# Patient Record
Sex: FEMALE | Race: WHITE | NOT HISPANIC OR LATINO | Employment: FULL TIME | URBAN - METROPOLITAN AREA
[De-identification: names, ages, dates, MRNs, and addresses within clinical notes are randomized per-mention and may not be internally consistent; named-entity substitution may affect disease eponyms.]

---

## 2017-01-03 ENCOUNTER — ANESTHESIA (OUTPATIENT)
Dept: PERIOP | Facility: HOSPITAL | Age: 52
End: 2017-01-03
Payer: COMMERCIAL

## 2017-01-03 ENCOUNTER — HOSPITAL ENCOUNTER (OUTPATIENT)
Facility: HOSPITAL | Age: 52
Setting detail: OUTPATIENT SURGERY
Discharge: HOME | End: 2017-01-03
Attending: OBSTETRICS & GYNECOLOGY | Admitting: OBSTETRICS & GYNECOLOGY
Payer: COMMERCIAL

## 2017-01-03 VITALS
SYSTOLIC BLOOD PRESSURE: 130 MMHG | TEMPERATURE: 97.5 F | DIASTOLIC BLOOD PRESSURE: 75 MMHG | HEIGHT: 67 IN | BODY MASS INDEX: 19.15 KG/M2 | WEIGHT: 122 LBS | HEART RATE: 73 BPM | OXYGEN SATURATION: 100 % | RESPIRATION RATE: 16 BRPM

## 2017-01-03 PROCEDURE — 51798 US URINE CAPACITY MEASURE: CPT | Performed by: OBSTETRICS & GYNECOLOGY

## 2017-01-03 PROCEDURE — C1771 REP DEV, URINARY, W/SLING: HCPCS | Performed by: OBSTETRICS & GYNECOLOGY

## 2017-01-03 DEVICE — SINGLE INCISION SLING SYSTEM
Type: IMPLANTABLE DEVICE | Status: FUNCTIONAL
Brand: ALTIS

## 2017-01-03 RX ORDER — MIDAZOLAM HYDROCHLORIDE 1 MG/ML
INJECTION INTRAMUSCULAR; INTRAVENOUS AS NEEDED
Status: DISCONTINUED | OUTPATIENT
Start: 2017-01-03 | End: 2017-01-03 | Stop reason: SURG

## 2017-01-03 RX ORDER — IBUPROFEN 600 MG/1
600 TABLET ORAL EVERY 6 HOURS PRN
Status: DISCONTINUED | OUTPATIENT
Start: 2017-01-03 | End: 2017-01-03 | Stop reason: HOSPADM

## 2017-01-03 RX ORDER — OXYCODONE HYDROCHLORIDE 5 MG/1
5 TABLET ORAL EVERY 4 HOURS PRN
Status: DISCONTINUED | OUTPATIENT
Start: 2017-01-03 | End: 2017-01-03 | Stop reason: HOSPADM

## 2017-01-03 RX ORDER — PROPOFOL 10 MG/ML
INJECTION, EMULSION INTRAVENOUS CONTINUOUS PRN
Status: DISCONTINUED | OUTPATIENT
Start: 2017-01-03 | End: 2017-01-03 | Stop reason: SURG

## 2017-01-03 RX ORDER — ONDANSETRON 2 MG/ML
INJECTION INTRAMUSCULAR; INTRAVENOUS AS NEEDED
Status: DISCONTINUED | OUTPATIENT
Start: 2017-01-03 | End: 2017-01-03 | Stop reason: SURG

## 2017-01-03 RX ORDER — FENTANYL CITRATE 50 UG/ML
INJECTION, SOLUTION INTRAMUSCULAR; INTRAVENOUS AS NEEDED
Status: DISCONTINUED | OUTPATIENT
Start: 2017-01-03 | End: 2017-01-03 | Stop reason: SURG

## 2017-01-03 RX ORDER — SODIUM CHLORIDE, SODIUM LACTATE, POTASSIUM CHLORIDE, CALCIUM CHLORIDE 600; 310; 30; 20 MG/100ML; MG/100ML; MG/100ML; MG/100ML
125 INJECTION, SOLUTION INTRAVENOUS CONTINUOUS
Status: DISCONTINUED | OUTPATIENT
Start: 2017-01-03 | End: 2017-01-03 | Stop reason: HOSPADM

## 2017-01-03 RX ORDER — ONDANSETRON 2 MG/ML
4 INJECTION INTRAMUSCULAR; INTRAVENOUS EVERY 6 HOURS PRN
Status: DISCONTINUED | OUTPATIENT
Start: 2017-01-03 | End: 2017-01-03 | Stop reason: HOSPADM

## 2017-01-03 RX ORDER — FENTANYL CITRATE/PF 50 MCG/ML
50 SYRINGE (ML) INJECTION
Status: DISCONTINUED | OUTPATIENT
Start: 2017-01-03 | End: 2017-01-03 | Stop reason: HOSPADM

## 2017-01-03 RX ORDER — ONDANSETRON 2 MG/ML
4 INJECTION INTRAMUSCULAR; INTRAVENOUS ONCE
Status: DISCONTINUED | OUTPATIENT
Start: 2017-01-03 | End: 2017-01-03 | Stop reason: HOSPADM

## 2017-01-03 RX ORDER — MORPHINE SULFATE 2 MG/ML
2 INJECTION, SOLUTION INTRAMUSCULAR; INTRAVENOUS
Status: DISCONTINUED | OUTPATIENT
Start: 2017-01-03 | End: 2017-01-03 | Stop reason: HOSPADM

## 2017-01-03 RX ORDER — SODIUM CHLORIDE 9 MG/ML
125 INJECTION, SOLUTION INTRAVENOUS CONTINUOUS
Status: DISCONTINUED | OUTPATIENT
Start: 2017-01-03 | End: 2017-01-03 | Stop reason: HOSPADM

## 2017-01-03 RX ORDER — PROPOFOL 10 MG/ML
INJECTION, EMULSION INTRAVENOUS AS NEEDED
Status: DISCONTINUED | OUTPATIENT
Start: 2017-01-03 | End: 2017-01-03 | Stop reason: SURG

## 2017-01-03 RX ORDER — HYDROMORPHONE HCL 110MG/55ML
1 PATIENT CONTROLLED ANALGESIA SYRINGE INTRAVENOUS EVERY 4 HOURS PRN
Status: DISCONTINUED | OUTPATIENT
Start: 2017-01-03 | End: 2017-01-03 | Stop reason: HOSPADM

## 2017-01-03 RX ADMIN — ONDANSETRON HYDROCHLORIDE 4 MG: 2 INJECTION, SOLUTION INTRAVENOUS at 14:54

## 2017-01-03 RX ADMIN — PROPOFOL 20 MG: 10 INJECTION, EMULSION INTRAVENOUS at 14:23

## 2017-01-03 RX ADMIN — FENTANYL CITRATE 100 MCG: 50 INJECTION, SOLUTION INTRAMUSCULAR; INTRAVENOUS at 14:15

## 2017-01-03 RX ADMIN — MIDAZOLAM HYDROCHLORIDE 2 MG: 1 INJECTION, SOLUTION INTRAMUSCULAR; INTRAVENOUS at 14:12

## 2017-01-03 RX ADMIN — PROPOFOL 20 MG: 10 INJECTION, EMULSION INTRAVENOUS at 14:50

## 2017-01-03 RX ADMIN — SODIUM CHLORIDE: 0.9 INJECTION, SOLUTION INTRAVENOUS at 14:52

## 2017-01-03 RX ADMIN — SODIUM CHLORIDE 125 ML/HR: 0.9 INJECTION, SOLUTION INTRAVENOUS at 12:08

## 2017-01-03 RX ADMIN — PROPOFOL 70 MCG/KG/MIN: 10 INJECTION, EMULSION INTRAVENOUS at 14:12

## 2017-01-03 RX ADMIN — CEFAZOLIN SODIUM 1000 MG: 1 SOLUTION INTRAVENOUS at 14:10

## 2017-01-03 RX ADMIN — MIDAZOLAM HYDROCHLORIDE 2 MG: 1 INJECTION, SOLUTION INTRAMUSCULAR; INTRAVENOUS at 14:10

## 2021-04-08 DIAGNOSIS — Z23 ENCOUNTER FOR IMMUNIZATION: ICD-10-CM

## 2021-07-07 ENCOUNTER — EVALUATION (OUTPATIENT)
Dept: PHYSICAL THERAPY | Facility: CLINIC | Age: 56
End: 2021-07-07
Payer: COMMERCIAL

## 2021-07-07 DIAGNOSIS — M76.32 ILIOTIBIAL BAND SYNDROME OF LEFT SIDE: Primary | ICD-10-CM

## 2021-07-07 DIAGNOSIS — M25.562 LEFT KNEE PAIN, UNSPECIFIED CHRONICITY: ICD-10-CM

## 2021-07-07 PROCEDURE — 97110 THERAPEUTIC EXERCISES: CPT | Performed by: PHYSICAL THERAPIST

## 2021-07-07 PROCEDURE — 97162 PT EVAL MOD COMPLEX 30 MIN: CPT | Performed by: PHYSICAL THERAPIST

## 2021-07-07 NOTE — PROGRESS NOTES
PT Evaluation     Today's date: 2021  Patient name: Kye eHrnández  : 1965  MRN: [id number]  Referring provider: Mary Loza MD  Dx:   Encounter Diagnosis     ICD-10-CM    1  Iliotibial band syndrome of left side  M76 32    2  Left knee pain, unspecified chronicity  M25 562               Assessment  Assessment details: Kye Hernández is a 63 y o  female who presents with pain, decreased strength, decreased ROM, joint effusion and ambulatory dysfunction  Due to these impairments, patient has difficulty performing ADL's, recreational activities, work-related activities, ambulation, stair negotiation, lifting/carrying  Patient's clinical presentation is consistent with their referring diagnosis of L knee ITB syndrome  Patient has been educated in home exercise program and plan of care  Patient would benefit from skilled physical therapy services to address their aforementioned functional limitations and progress towards prior level of function and independence with home exercise program      Impairments: abnormal gait, abnormal or restricted ROM, activity intolerance, impaired balance, impaired physical strength, lacks appropriate home exercise program, pain with function, weight-bearing intolerance, poor posture  and poor body mechanics  Functional limitations: walk, stand, STS, hiking, stair negotiation, descending ramps, lunge, ramps   Prognosis: good  Prognosis details: + factors: active lifestyle  - factors: chronicity of pain    Goals  Short Term Goals to be accomplished in 4 weeks:  STG1: Pt will be I with HEP  STG2: Pt will demonstrate 3+/5 MMT in b/l hip abduction to improve gait  STG3: Pt will demo 4/5 MMT strength in knee to improve stair negotiation  STG4: Pt will amb on flat hike without pain  Long Term Goals to be accomplished in 12 weeks:   LTG1: Pt will demo knee strength WNL to return to PLOF  LTG2: Pt will be able to descend stairs, declines without pain     LTG3: Pt will return to hiking complex hikes pain free as per PLOF  Plan  Plan details: HEP development, stretching, strengthening, A/AA/PROM, joint mobilizations, posture education, STM/MI as needed to reduce muscle tension, muscle reeducation, PLOC discussed and agreed upon with patient  Patient would benefit from: PT eval and skilled physical therapy  Planned modality interventions: cryotherapy, thermotherapy: hydrocollator packs and unattended electrical stimulation  Planned therapy interventions: manual therapy, neuromuscular re-education, self care, therapeutic activities, therapeutic exercise, home exercise program, patient education, joint mobilization, balance, stretching, strengthening and flexibility  Frequency: 2x week  Duration in weeks: 12  Plan of Care beginning date: 2021  Plan of Care expiration date: 2021  Treatment plan discussed with: patient      Subjective Evaluation    History of Present Illness  Mechanism of injury: Pt is a 62 y/o female who presents to PT with reports of L knee pain beginning "a while ago " 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  Notes that recently the pain in her knee has been "getting worse and worse" with descending stairs/ramps  Notes that she went to her physician who prescribed PT     Pain  Current pain ratin  At best pain ratin  At worst pain ratin  Location: L ITB insertion  Quality: throbbing, tight, pulling, discomfort and radiating  Relieving factors: ice, relaxation and rest  Aggravating factors: standing, walking, stair climbing and lifting    Treatments  Current treatment: physical therapy  Patient Goals  Patient goals for therapy: decreased edema, decreased pain, improved balance, increased motion, independence with ADLs/IADLs, increased strength and return to sport/leisure activities  Patient goal: walk, stand, STS, hiking, stair negotiation, descending ramps, lunge, ramps      Objective     Observations   Left Knee Positive for edema  Additional Observation Details    Tenderness   Left Knee   Tenderness in the ITB and lateral joint line  No tenderness in the medial joint line  Active Range of Motion   Left Knee   Flexion: 130 degrees   Prone flexion: 115 degrees   Extension: 0 degrees     Strength/Myotome Testing     Left Knee   Flexion: 4-  Extension: 4-    Right Knee   Flexion: 5  Extension: 5    Additional Strength Details  Hip abd:  R: 3-/5  L: 3-/5    SLR  R: 5/5  L: 4/5    Figure 4 bridge caused spasm in L hamstring     Tests     Left Knee   Negative valgus stress test at 30 degrees and varus stress test at 30 degrees       Additional Tests Details  [place] compression test: L +         Precautions: standard    Manuals 7/7            jt mob/pat mob             STM, PROM stretch                                       Neuro Re-Ed 7/7            SLS             Tandem                                                                              Ther Ex 7/7            SLR 2x15            bridge 2x20            S/l hip abd 2x10            Seated 3 way hamstring stretch :15x2            Prone knee flexion stretch strap :15x2                                                   Ther Activity 7/7                                                                             Modalities 7/7                         CT

## 2021-07-07 NOTE — LETTER
2021    Mallory Preston MD  [address] [address]    Patient: Obdulia Burgos   YOB: 1965   Date of Visit: 2021     Encounter Diagnosis     ICD-10-CM    1  Iliotibial band syndrome of left side  M76 32    2  Left knee pain, unspecified chronicity  M25 562        Dear Dr Deisy Gutierrez: Thank you for your recent referral of Obdulia Burgos  Please review the attached evaluation summary from Miriam's recent visit  Please verify that you agree with the plan of care by signing the attached order  If you have any questions or concerns, please do not hesitate to call  I sincerely appreciate the opportunity to share in the care of one of your patients and hope to have another opportunity to work with you in the near future  Sincerely,    Shaneka Piña, PT      Referring Provider:      I certify that I have read the below Plan of Care and certify the need for these services furnished under this plan of treatment while under my care  Mallory Preston MD  [address] [address]  Via Fax: [phone]          PT Evaluation     Today's date: 2021  Patient name: Obdulia Burgos  : 1965  MRN: [id number]  Referring provider: Caitlin Wise MD  Dx:   Encounter Diagnosis     ICD-10-CM    1  Iliotibial band syndrome of left side  M76 32    2  Left knee pain, unspecified chronicity  M25 562               Assessment  Assessment details: Obdulia Burgos is a 63 y o  female who presents with pain, decreased strength, decreased ROM, joint effusion and ambulatory dysfunction  Due to these impairments, patient has difficulty performing ADL's, recreational activities, work-related activities, ambulation, stair negotiation, lifting/carrying  Patient's clinical presentation is consistent with their referring diagnosis of L knee ITB syndrome  Patient has been educated in home exercise program and plan of care   Patient would benefit from skilled physical therapy services to address their aforementioned functional limitations and progress towards prior level of function and independence with home exercise program      Impairments: abnormal gait, abnormal or restricted ROM, activity intolerance, impaired balance, impaired physical strength, lacks appropriate home exercise program, pain with function, weight-bearing intolerance, poor posture  and poor body mechanics  Functional limitations: walk, stand, STS, hiking, stair negotiation, descending ramps, lunge, ramps   Prognosis: good  Prognosis details: + factors: active lifestyle  - factors: chronicity of pain    Goals  Short Term Goals to be accomplished in 4 weeks:  STG1: Pt will be I with HEP  STG2: Pt will demonstrate 3+/5 MMT in b/l hip abduction to improve gait  STG3: Pt will demo 4/5 MMT strength in knee to improve stair negotiation  STG4: Pt will amb on flat hike without pain  Long Term Goals to be accomplished in 12 weeks:   LTG1: Pt will demo knee strength WNL to return to PLOF  LTG2: Pt will be able to descend stairs, declines without pain  LTG3: Pt will return to hiking complex hikes pain free as per PLOF  Plan  Plan details: HEP development, stretching, strengthening, A/AA/PROM, joint mobilizations, posture education, STM/MI as needed to reduce muscle tension, muscle reeducation, PLOC discussed and agreed upon with patient        Patient would benefit from: PT eval and skilled physical therapy  Planned modality interventions: cryotherapy, thermotherapy: hydrocollator packs and unattended electrical stimulation  Planned therapy interventions: manual therapy, neuromuscular re-education, self care, therapeutic activities, therapeutic exercise, home exercise program, patient education, joint mobilization, balance, stretching, strengthening and flexibility  Frequency: 2x week  Duration in weeks: 12  Plan of Care beginning date: 7/7/2021  Plan of Care expiration date: 9/29/2021  Treatment plan discussed with: patient      Subjective Evaluation    History of Present Illness  Mechanism of injury: Pt is a 62 y/o female who presents to PT with reports of L knee pain beginning "a while ago " 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  Notes that recently the pain in her knee has been "getting worse and worse" with descending stairs/ramps  Notes that she went to her physician who prescribed PT  Pain  Current pain ratin  At best pain ratin  At worst pain ratin  Location: L ITB insertion  Quality: throbbing, tight, pulling, discomfort and radiating  Relieving factors: ice, relaxation and rest  Aggravating factors: standing, walking, stair climbing and lifting    Treatments  Current treatment: physical therapy  Patient Goals  Patient goals for therapy: decreased edema, decreased pain, improved balance, increased motion, independence with ADLs/IADLs, increased strength and return to sport/leisure activities  Patient goal: walk, stand, STS, hiking, stair negotiation, descending ramps, lunge, ramps      Objective     Observations   Left Knee   Positive for edema  Additional Observation Details    Tenderness   Left Knee   Tenderness in the ITB and lateral joint line  No tenderness in the medial joint line  Active Range of Motion   Left Knee   Flexion: 130 degrees   Prone flexion: 115 degrees   Extension: 0 degrees     Strength/Myotome Testing     Left Knee   Flexion: 4-  Extension: 4-    Right Knee   Flexion: 5  Extension: 5    Additional Strength Details  Hip abd:  R: 3-/5  L: 3-/5    SLR  R: 5/5  L: 4/5    Figure 4 bridge caused spasm in L hamstring     Tests     Left Knee   Negative valgus stress test at 30 degrees and varus stress test at 30 degrees       Additional Tests Details  [place] compression test: L +         Precautions: standard    Manuals             jt mob/pat mob             STM, PROM stretch                                       Neuro Re-Ed             SLS Tandem                                                                              Ther Ex 7/7            SLR 2x15            bridge 2x20            S/l hip abd 2x10            Seated 3 way hamstring stretch :15x2            Prone knee flexion stretch strap :15x2                                                   Ther Activity 7/7                                                                             Modalities 7/7                         CT

## 2021-07-12 ENCOUNTER — APPOINTMENT (OUTPATIENT)
Dept: PHYSICAL THERAPY | Facility: CLINIC | Age: 56
End: 2021-07-12
Payer: COMMERCIAL

## 2021-07-14 ENCOUNTER — OFFICE VISIT (OUTPATIENT)
Dept: PHYSICAL THERAPY | Facility: CLINIC | Age: 56
End: 2021-07-14
Payer: COMMERCIAL

## 2021-07-14 DIAGNOSIS — M76.32 ILIOTIBIAL BAND SYNDROME OF LEFT SIDE: Primary | ICD-10-CM

## 2021-07-14 DIAGNOSIS — M25.562 LEFT KNEE PAIN, UNSPECIFIED CHRONICITY: ICD-10-CM

## 2021-07-14 PROCEDURE — 97530 THERAPEUTIC ACTIVITIES: CPT | Performed by: PHYSICAL THERAPIST

## 2021-07-14 PROCEDURE — 97140 MANUAL THERAPY 1/> REGIONS: CPT | Performed by: PHYSICAL THERAPIST

## 2021-07-14 PROCEDURE — 97110 THERAPEUTIC EXERCISES: CPT | Performed by: PHYSICAL THERAPIST

## 2021-07-14 NOTE — PROGRESS NOTES
Daily Note     Today's date: 2021  Patient name: Mike Nielsen  : 1965  MRN: [id number]  Referring provider: Renetta Quiroz MD  Dx:   Encounter Diagnosis     ICD-10-CM    1  Iliotibial band syndrome of left side  M76 32    2  Left knee pain, unspecified chronicity  M25 562             Subjective: Pt reported that she has been doing her HEP as prescribed  Stated that it has increased her muscle soreness in her hips mostly  Objective: See treatment diary below    Assessment: Tolerated treatment well  Advanced strengthening well without provocation of pain  Patient demonstrated fatigue post treatment, exhibited good technique with therapeutic exercises and would benefit from continued PT  Plan: Continue per plan of care        Precautions: standard    Manuals            jt mob/pat mob  JZ           STM, PROM stretch  JZ                                     Neuro Re-Ed            SLS             Tandem             SLS BOSU                                                                 Ther Ex            SLR 2x15            bridge 2x20            S/l hip abd 2x10            Seated 3 way hamstring stretch :15x2 :15x3           Prone knee flexion stretch strap :15x2 :15x3            U/l heel raise  Hold           TB side step  G 3x10                        Ther Activity            RB  3'            U/l LP in PF  70/ 3x10 ea           Calf press  150/ 3x10                                     Modalities            MH             CT

## 2021-07-19 ENCOUNTER — OFFICE VISIT (OUTPATIENT)
Dept: PHYSICAL THERAPY | Facility: CLINIC | Age: 56
End: 2021-07-19
Payer: COMMERCIAL

## 2021-07-19 DIAGNOSIS — M25.562 LEFT KNEE PAIN, UNSPECIFIED CHRONICITY: ICD-10-CM

## 2021-07-19 DIAGNOSIS — M76.32 ILIOTIBIAL BAND SYNDROME OF LEFT SIDE: Primary | ICD-10-CM

## 2021-07-19 PROCEDURE — 97112 NEUROMUSCULAR REEDUCATION: CPT | Performed by: PHYSICAL THERAPIST

## 2021-07-19 PROCEDURE — 97530 THERAPEUTIC ACTIVITIES: CPT | Performed by: PHYSICAL THERAPIST

## 2021-07-19 NOTE — PROGRESS NOTES
Daily Note     Today's date: 2021  Patient name: Prince Price  : 1965  MRN: [id number]  Referring provider: Maritza Mccloud MD  Dx:   Encounter Diagnosis     ICD-10-CM    1  Iliotibial band syndrome of left side  M76 32    2  Left knee pain, unspecified chronicity  M25 562             Subjective: Pt reported that she has been doing her exercises and "I am feeling stronger and stronger "     Objective: See treatment diary below    Assessment: Tolerated treatment well  Advanced strengthening and balance well without provocation of pain  Patient demonstrated fatigue post treatment, exhibited good technique with therapeutic exercises and would benefit from continued PT  Plan: Continue per plan of care        Precautions: standard    Manuals           jt mob/pat mob  JZ           STM, PROM stretch  JZ                                     Neuro Re-Ed           SLS foam   :30x3ea          Tandem Foam    :30x3 ea          SLS cone tap    2x10 ea          Tandem walk                                                    Ther Ex           SLR 2x15            bridge 2x20            S/l hip abd 2x10            Seated 3 way hamstring stretch :15x2 :15x3           Prone knee flexion stretch strap :15x2 :15x3            U/l heel raise  Hold           TB side step  G 3x10                        Ther Activity           RB  3'  5' lvl 3           U/l LP in PF  70/ 3x10 ea 70/x10 75/ 2x10          Calf press  150/ 3x10 150/ 3x12          Lateral step down   8" 3x10 ea          U/l STS 2 hand assist   3x10 ea          Modalities                        CT

## 2021-07-21 ENCOUNTER — OFFICE VISIT (OUTPATIENT)
Dept: PHYSICAL THERAPY | Facility: CLINIC | Age: 56
End: 2021-07-21
Payer: COMMERCIAL

## 2021-07-21 DIAGNOSIS — M25.562 LEFT KNEE PAIN, UNSPECIFIED CHRONICITY: ICD-10-CM

## 2021-07-21 DIAGNOSIS — M76.32 ILIOTIBIAL BAND SYNDROME OF LEFT SIDE: Primary | ICD-10-CM

## 2021-07-21 PROCEDURE — 97530 THERAPEUTIC ACTIVITIES: CPT

## 2021-07-21 PROCEDURE — 97110 THERAPEUTIC EXERCISES: CPT

## 2021-07-21 PROCEDURE — 97112 NEUROMUSCULAR REEDUCATION: CPT

## 2021-07-21 NOTE — PROGRESS NOTES
Daily Note     Today's date: 2021  Patient name: Kye Hernández  : 1965  MRN: [id number]  Referring provider: Mary Loza MD  Dx:   Encounter Diagnosis     ICD-10-CM    1  Iliotibial band syndrome of left side  M76 32    2  Left knee pain, unspecified chronicity  M25 562             Subjective: Clarice Alaniz reports her thighs were very sore following last PT session with sx's still present today  She states R side is more sore compared to L side  Objective: See treatment diary below    Assessment: Miriam tolerated PT treatment well  Held on quad dominate strength training today d/t subjective reports  Pt completed glut strengthening well, TB 3 way was added to program  She required cues to avoid excessive trunk movement with exercise  She performed balance training with good NM control and little instability  Fatigue present post session  Resume full program NV  Pt would benefit from continued PT to further address impairments and maximize functional level  Plan: Continue per plan of care        Precautions: standard    Manuals          jt mob/pat mob  JZ           STM, PROM stretch  JZ                                     Neuro Re-Ed          SLS foam   :30x3ea :30x3 ea          Tandem Foam    :30x3 ea :30x3 ea          SLS cone tap    2x10 ea 20x ea          Tandem walk                                                    Ther Ex          SLR 2x15            bridge 2x20            S/l hip abd 2x10            Seated 3 way hamstring stretch :15x2 :15x3  Supine :30x3         Prone knee flexion stretch strap :15x2 :15x3   :30x3          U/l heel raise  Hold           TB side step  G 3x10  G 3 laps          Hip 3 way TB     G 30x ea          Ther Activity          RB  3'  5' lvl 3  5' lvl 3          U/l LP in PF  70/ 3x10 ea 70/x10 75/ 2x10 70/ 3x10          Calf press  150/ 3x10 150/ 3x12 110/ 3x10          Lateral step down   8" 3x10 ea          U/l STS 2 hand assist   3x10 ea x10          Modalities 7/7 7/14 7/19 7/21                      CT

## 2021-07-26 ENCOUNTER — OFFICE VISIT (OUTPATIENT)
Dept: PHYSICAL THERAPY | Facility: CLINIC | Age: 56
End: 2021-07-26
Payer: COMMERCIAL

## 2021-07-26 DIAGNOSIS — M76.32 ILIOTIBIAL BAND SYNDROME OF LEFT SIDE: Primary | ICD-10-CM

## 2021-07-26 DIAGNOSIS — M25.562 LEFT KNEE PAIN, UNSPECIFIED CHRONICITY: ICD-10-CM

## 2021-07-26 PROCEDURE — 97110 THERAPEUTIC EXERCISES: CPT | Performed by: PHYSICAL THERAPIST

## 2021-07-26 PROCEDURE — 97530 THERAPEUTIC ACTIVITIES: CPT | Performed by: PHYSICAL THERAPIST

## 2021-07-26 PROCEDURE — 97112 NEUROMUSCULAR REEDUCATION: CPT | Performed by: PHYSICAL THERAPIST

## 2021-07-26 NOTE — PROGRESS NOTES
Daily Note     Today's date: 2021  Patient name: Deyvi Strickland  : 1965  MRN: [id number]   Referring provider: Ashleigh Mendez MD  Dx:   Encounter Diagnosis     ICD-10-CM    1  Iliotibial band syndrome of left side  M76 32    2  Left knee pain, unspecified chronicity  M25 562             Subjective: Pt reported that she is gaining strength as sessions continue and stated that she is also having less pain  Objective: See treatment diary below    Assessment: Tolerated treatment well  Advanced balance training well without provocation of pain  Patient demonstrated fatigue post treatment, exhibited good technique with therapeutic exercises and would benefit from continued PT  Plan: Continue per plan of care        Precautions: standard    Manuals         jt mob/pat mob  JZ           STM, PROM stretch  JZ                                     Neuro Re-Ed         SLS foam   :30x3ea :30x3 ea          Tandem Foam    :30x3 ea :30x3 ea          SLS cone tap    2x10 ea 20x ea          SLS cone M's     2x10 ea        SLS BOSU     :20x3 ea        Fitter AP b/l     3x10        Fitter AP u/l     3x10 ea        Ther Ex         Slider lunge     2x10 ea        BOSU hamstring slider b/l     3x10        S/l hip abd 2x10            Seated 3 way hamstring stretch :15x2 :15x3  Supine :30x3 :30x3         Prone knee flexion stretch strap :15x2 :15x3   :30x3          U/l heel raise  Hold           TB side step  G 3x10  G 3 laps          Hip 3 way TB     G 30x ea          Ther Activity         RB  3'  5' lvl 3  5' lvl 3          U/l LP in PF  70/ 3x10 ea 70/x10 75/ 2x10 70/ 3x10  70/ 3x101        Calf press  150/ 3x10 150/ 3x12 110/ 3x10  10/ 3x10        Lateral step down   8" 3x10 ea  8" 3x10        U/l STS 2 hand assist   3x10 ea x10  3x10        Modalities                      CT

## 2021-07-28 ENCOUNTER — OFFICE VISIT (OUTPATIENT)
Dept: PHYSICAL THERAPY | Facility: CLINIC | Age: 56
End: 2021-07-28
Payer: COMMERCIAL

## 2021-07-28 DIAGNOSIS — M76.32 ILIOTIBIAL BAND SYNDROME OF LEFT SIDE: Primary | ICD-10-CM

## 2021-07-28 DIAGNOSIS — M25.562 LEFT KNEE PAIN, UNSPECIFIED CHRONICITY: ICD-10-CM

## 2021-07-28 PROCEDURE — 97110 THERAPEUTIC EXERCISES: CPT | Performed by: PHYSICAL THERAPIST

## 2021-07-28 PROCEDURE — 97530 THERAPEUTIC ACTIVITIES: CPT | Performed by: PHYSICAL THERAPIST

## 2021-07-28 PROCEDURE — 97112 NEUROMUSCULAR REEDUCATION: CPT | Performed by: PHYSICAL THERAPIST

## 2021-07-28 NOTE — PROGRESS NOTES
Daily Note     Today's date: 2021  Patient name: Reyna Fritz  : 1965  MRN: [id number]  Referring provider: Claudia Delacruz MD  Dx:   Encounter Diagnosis     ICD-10-CM    1  Iliotibial band syndrome of left side  M76 32    2  Left knee pain, unspecified chronicity  M25 562             Subjective: Pt reported that she has been gaining strength and balance as the sessions continue  Objective: See treatment diary below    Assessment: Tolerated treatment well  Advanced strengthening well without provocation of pain  Patient demonstrated fatigue post treatment, exhibited good technique with therapeutic exercises and would benefit from continued PT  Plan: Continue per plan of care        Precautions: standard    Manuals        jt mob/pat mob  JZ           STM, PROM stretch  JZ                                     Neuro Re-Ed        SLS foam   :30x3ea :30x3 ea          Tandem Foam    :30x3 ea :30x3 ea          SLS cone tap    2x10 ea 20x ea          SLS cone M's     2x10 ea        SLS BOSU     :20x3 ea :20x3       BOSU slider mountain climbers      3x10       BOSU squat      3x10                    Fitter AP b/l     3x10        Fitter AP u/l     3x10 ea        Ther Ex        Slider lunge     2x10 ea 2x10 ea       BOSU hamstring slider b/l     3x10 3x10       S/l hip abd 2x10            Seated 3 way hamstring stretch :15x2 :15x3  Supine :30x3 :30x3  :30x3       Prone knee flexion stretch strap :15x2 :15x3   :30x3          U/l heel raise  Hold           TB side step  G 3x10  G 3 laps          Hip 3 way TB     G 30x ea          Ther Activity        RB  3'  5' lvl 3  5' lvl 3          U/l LP in PF  70/ 3x10 ea 70/x10 75/ 2x10 70/ 3x10  70/ 3x101 90/ 3x10       Calf press  150/ 3x10 150/ 3x12 110/ 3x10  110/ 3x10 130/ 3x10       Lateral step down   8" 3x10 ea  8" 3x10        Forward step down      8" 3x10       U/l STS 2 hand assist   3x10 ea x10  3x10        Modalities 7/7 7/14 7/19 7/21 7/26 7/28                    CT

## 2021-08-02 ENCOUNTER — OFFICE VISIT (OUTPATIENT)
Dept: PHYSICAL THERAPY | Facility: CLINIC | Age: 56
End: 2021-08-02
Payer: COMMERCIAL

## 2021-08-02 DIAGNOSIS — M76.32 ILIOTIBIAL BAND SYNDROME OF LEFT SIDE: Primary | ICD-10-CM

## 2021-08-02 DIAGNOSIS — M25.562 LEFT KNEE PAIN, UNSPECIFIED CHRONICITY: ICD-10-CM

## 2021-08-02 PROCEDURE — 97112 NEUROMUSCULAR REEDUCATION: CPT

## 2021-08-02 PROCEDURE — 97530 THERAPEUTIC ACTIVITIES: CPT

## 2021-08-02 PROCEDURE — 97110 THERAPEUTIC EXERCISES: CPT

## 2021-08-02 NOTE — PROGRESS NOTES
Daily Note     Today's date: 2021  Patient name: Prince Price  : 1965  MRN: [id number]  Referring provider: Maritza Mccloud MD  Dx:   Encounter Diagnosis     ICD-10-CM    1  Iliotibial band syndrome of left side  M76 32    2  Left knee pain, unspecified chronicity  M25 562             Subjective: Roberth Lara reports minimal to no L knee or ITB pain since LV  She notes mild muscle soreness in b/l hips following last PT session  Objective: See treatment diary below    Assessment: Miriam tolerated PT treatment well  Pt is appropriately challenged with current exercise program  She displayed improved neuromuscular control with Bosu activities, Greatest challenged observed with stair navigation, pt unable to maintain eccentric control w/o knee valgus , cues required to correct  Lateral slider lunges added to program  Fatigue evident post session  Pt would benefit from continued PT to further address impairments and maximize functional level  Plan: Continue per plan of care        Precautions: standard    Manuals  8/2      jt mob/pat mob  JZ           STM, PROM stretch  JZ                                     Neuro Re-Ed  8/2      SLS foam   :30x3ea :30x3 ea          Tandem Foam    :30x3 ea :30x3 ea          SLS cone tap    2x10 ea 20x ea          SLS cone M's     2x10 ea        SLS BOSU     :20x3 ea :20x3 :20x3       BOSU slider mountain climbers      3x10 3x10      BOSU squat      3x10 3x10                   Fitter AP b/l     3x10        Fitter AP u/l     3x10 ea        Ther Ex  8/2      Slider lunge     2x10 ea 2x10 ea 2x10 ea + lateral 2x10 ea       BOSU hamstring slider b/l     3x10 3x10 3x10      S/l hip abd 2x10            Seated 3 way hamstring stretch :15x2 :15x3  Supine :30x3 :30x3  :30x3       Prone knee flexion stretch strap :15x2 :15x3   :30x3          U/l heel raise  Hold           TB side step  G 3x10  G 3 laps B 3 laps + monster walks 3 laps       Hip 3 way TB     G 30x ea          Ther Activity 7/7 7/14 7/19 7/21 7/26 7/28 8/2      RB  3'  5' lvl 3  5' lvl 3          U/l LP in PF  70/ 3x10 ea 70/x10 75/ 2x10 70/ 3x10  70/ 3x101 90/ 3x10       Calf press  150/ 3x10 150/ 3x12 110/ 3x10  110/ 3x10 130/ 3x10       Lateral step down   8" 3x10 ea  8" 3x10  8" 3x10      Forward step down      8" 3x10 8" 3x10      U/l STS 2 hand assist   3x10 ea x10  3x10        Modalities 7/7 7/14 7/19 7/21 7/26 7/28 8/2                   CT

## 2021-08-09 ENCOUNTER — OFFICE VISIT (OUTPATIENT)
Dept: PHYSICAL THERAPY | Facility: CLINIC | Age: 56
End: 2021-08-09
Payer: COMMERCIAL

## 2021-08-09 DIAGNOSIS — M76.32 ILIOTIBIAL BAND SYNDROME OF LEFT SIDE: Primary | ICD-10-CM

## 2021-08-09 DIAGNOSIS — M25.562 LEFT KNEE PAIN, UNSPECIFIED CHRONICITY: ICD-10-CM

## 2021-08-09 PROCEDURE — 97112 NEUROMUSCULAR REEDUCATION: CPT | Performed by: PHYSICAL THERAPIST

## 2021-08-09 PROCEDURE — 97530 THERAPEUTIC ACTIVITIES: CPT | Performed by: PHYSICAL THERAPIST

## 2021-08-09 PROCEDURE — 97110 THERAPEUTIC EXERCISES: CPT | Performed by: PHYSICAL THERAPIST

## 2021-08-09 NOTE — PROGRESS NOTES
Daily Note     Today's date: 2021  Patient name: Prince Price  : 1965  MRN: [id number]  Referring provider: Maritza Mccloud MD  Dx:   Encounter Diagnosis     ICD-10-CM    1  Iliotibial band syndrome of left side  M76 32    2  Left knee pain, unspecified chronicity  M25 562             Subjective: Pt reported that she has been doing her exercises at home  Noted that she [place] the city 2 miles [place] the weekend without pain  Noted that "It felt like nothing "     Objective: See treatment diary below    Assessment: Tolerated treatment well  Advanced strengthening well without provocation of pain  Patient demonstrated fatigue post treatment, exhibited good technique with therapeutic exercises and would benefit from continued PT  Plan: Continue per plan of care        Precautions: standard    Manuals  8     jt mob/pat mob  JZ           STM, PROM stretch  JZ                                     Neuro Re-Ed  8/     SLS foam   :30x3ea :30x3 ea          Tandem Foam    :30x3 ea :30x3 ea          SLS cone tap    2x10 ea 20x ea          SLS cone M's     2x10 ea        SLS BOSU     :20x3 ea :20x3 :20x3  :30x3 ea     BOSU slider mountain climbers      3x10 3x10 3x10     BOSU squat      3x10 3x10      Slider lunge walk        5x 16' ea     Fitter AP b/l     3x10        Fitter AP u/l     3x10 ea        Ther Ex  8/ 8/9     Slider lunge     2x10 ea 2x10 ea 2x10 ea + lateral 2x10 ea       BOSU hamstring slider b/l     3x10 3x10 3x10      STS w/ db        10# 3x10     Seated 3 way hamstring stretch :15x2 :15x3  Supine :30x3 :30x3  :30x3       Prone knee flexion stretch strap :15x2 :15x3   :30x3          Standing quad stretch        :30x3     Stand TB march        O 3x15 ea     Hip 3 way TB     G 30x ea          Ther Activity  8 8/9     RB  3'  5' lvl 3  5' lvl 3          U/l LP in PF  70/ 3x10 ea 70/x10 75/ 2x10 70/ 3x10  70/ 3x101 90/ 3x10  90/ 3x10 ea     U/l LP eccentric (2 concentric, 1 ecc)        110/10x ea  90/ 10x ea     Calf press  150/ 3x10 150/ 3x12 110/ 3x10  110/ 3x10 130/ 3x10       Lateral step down   8" 3x10 ea  8" 3x10  8" 3x10      Forward step down      8" 3x10 8" 3x10      U/l STS 2 hand assist   3x10 ea x10  3x10        Modalities 7/7 7/14 7/19 7/21 7/26 7/28 8/2 8/9                  CT

## 2021-08-16 ENCOUNTER — OFFICE VISIT (OUTPATIENT)
Dept: PHYSICAL THERAPY | Facility: CLINIC | Age: 56
End: 2021-08-16
Payer: COMMERCIAL

## 2021-08-16 DIAGNOSIS — M76.32 ILIOTIBIAL BAND SYNDROME OF LEFT SIDE: Primary | ICD-10-CM

## 2021-08-16 DIAGNOSIS — M25.562 LEFT KNEE PAIN, UNSPECIFIED CHRONICITY: ICD-10-CM

## 2021-08-16 PROCEDURE — 97110 THERAPEUTIC EXERCISES: CPT | Performed by: PHYSICAL THERAPIST

## 2021-08-16 PROCEDURE — 97530 THERAPEUTIC ACTIVITIES: CPT | Performed by: PHYSICAL THERAPIST

## 2021-08-16 PROCEDURE — 97112 NEUROMUSCULAR REEDUCATION: CPT | Performed by: PHYSICAL THERAPIST

## 2021-08-16 NOTE — PROGRESS NOTES
Daily Note     Today's date: 2021  Patient name: Miguel Moreno  : 1965  MRN: [id number]  Referring provider: Emma Collado MD  Dx:   Encounter Diagnosis     ICD-10-CM    1  Iliotibial band syndrome of left side  M76 32    2  Left knee pain, unspecified chronicity  M25 562             Subjective: Pt reported that she has been doing her exercises as prescribed  Noted that she was able to do a very light hike without pain, which she was very happy about  Objective: See treatment diary below    Assessment: Tolerated treatment well  Patient demonstrated fatigue post treatment, exhibited good technique with therapeutic exercises and would benefit from continued PT  Plan: Continue per plan of care        Precautions: standard    Manuals     jt mob/pat mob  JZ           STM, PROM stretch  JZ                                     Neuro Re-Ed     SLS foam   :30x3ea :30x3 ea          Tandem Foam    :30x3 ea :30x3 ea          SLS cone tap    2x10 ea 20x ea          SLS cone M's     2x10 ea        SLS BOSU     :20x3 ea :20x3 :20x3  :30x3 ea :30x3    BOSU slider mountain climbers      3x10 3x10 3x10     BOSU squat      3x10 3x10  3x10    Slider lunge walk        5x 16' ea 5# 2x10 ea    Fitter AP b/l     3x10        Fitter AP u/l     3x10 ea        Ther Ex     Slider lunge     2x10 ea 2x10 ea 2x10 ea + lateral 2x10 ea       BOSU hamstring slider b/l     3x10 3x10 3x10      STS w/ db        10# 3x10 10# 3x10    Seated 3 way hamstring stretch :15x2 :15x3  Supine :30x3 :30x3  :30x3       Prone knee flexion stretch strap :15x2 :15x3   :30x3          Standing quad stretch        :30x3 :30x3 ea    Stand TB march        O 3x15 ea     Hip 3 way TB     G 30x ea          Ther Activity 7/7 7/14 7/19 7/21 7/26 7/28 8/2 8/9 8/16    RB  3'  5' lvl 3  5' lvl 3          U/l LP in PF  70/ 3x10 ea 70/x10 75/ 2x10 70/ 3x10  70/ 3x101 90/ 3x10  90/ 3x10 ea 90/     U/l LP eccentric (2 concentric, 1 ecc)        90/ 10x2 ea 90/ 2x10 ea    Calf press  150/ 3x10 150/ 3x12 110/ 3x10  110/ 3x10 130/ 3x10   130/ 3x10    Lateral step down   8" 3x10 ea  8" 3x10  8" 3x10      Forward step down      8" 3x10 8" 3x10      U/l STS 2 hand assist   3x10 ea x10  3x10    2x10 ea    Modalities 7/7 7/14 7/19 7/21 7/26 7/28 8/2 8/9 8/16    MH             CT

## 2021-08-23 ENCOUNTER — OFFICE VISIT (OUTPATIENT)
Dept: PHYSICAL THERAPY | Facility: CLINIC | Age: 56
End: 2021-08-23
Payer: COMMERCIAL

## 2021-08-23 DIAGNOSIS — M76.32 ILIOTIBIAL BAND SYNDROME OF LEFT SIDE: Primary | ICD-10-CM

## 2021-08-23 DIAGNOSIS — M25.562 LEFT KNEE PAIN, UNSPECIFIED CHRONICITY: ICD-10-CM

## 2021-08-23 PROCEDURE — 97112 NEUROMUSCULAR REEDUCATION: CPT | Performed by: PHYSICAL THERAPIST

## 2021-08-23 PROCEDURE — 97530 THERAPEUTIC ACTIVITIES: CPT | Performed by: PHYSICAL THERAPIST

## 2021-08-23 PROCEDURE — 97110 THERAPEUTIC EXERCISES: CPT | Performed by: PHYSICAL THERAPIST

## 2021-08-23 NOTE — PROGRESS NOTES
Daily Note     Today's date: 2021  Patient name: Felicia Jama  : 1965  MRN: [id number]  Referring provider: Fuentes Rowe MD  Dx:   Encounter Diagnosis     ICD-10-CM    1  Iliotibial band syndrome of left side  M76 32    2  Left knee pain, unspecified chronicity  M25 562            Subjective: Pt reported that she has not been attempting hiking yet due to weather issues  Objective: See treatment diary below    Assessment: Tolerated treatment well  Advanced strengthening well without provocation of pain  Patient demonstrated fatigue post treatment, exhibited good technique with therapeutic exercises and would benefit from continued PT  Plan: Continue per plan of care        Precautions: standard    Manuals          jt mob/pat mob             STM, PROM stretch                                       Neuro Re-Ed          SLS foam             Tandem Foam              SLS cone tap              SLS cone M's             SLS BOSU       :20x3  :30x3 ea :30x3 :30x3   BOSU slider mountain climbers       3x10 3x10     BOSU squat       3x10  3x10 3x10   Slider lunge walk        5x 16' ea 5# 2x10 ea 5x    Fitter AP b/l             Fitter AP u/l             Ther Ex          Slider lunge       2x10 ea + lateral 2x10 ea       BOSU hamstring slider b/l       3x10      STS w/ db        10# 3x10 10# 3x10 10# 3x12   Seated  hamstring stretch          :20x3 ea   Prone knee flexion stretch strap             Standing quad stretch        :30x3 :30x3 ea :30x3   Stand TB march        O 3x15 ea  O 3x15 ea   Hip 3 way TB              Ther Activity          Elliptical          5', 1 5x3   U/l LP in PF        90/ 3x10 ea 90/     U/l LP eccentric (2 concentric, 1 ecc)        90/ 10x2 ea 90/ 2x10 ea 90/ 2x10   Calf press         130/ 3x10    Lateral step down       8" 3x10      Forward step down       8" 3x10      Step ups          10# 8" 2x10   U/l STS 2 hand assist         2x10 ea 2x10 ea   Modalities       8/2 8/9 8/16 8/23                CT

## 2021-08-30 ENCOUNTER — OFFICE VISIT (OUTPATIENT)
Dept: PHYSICAL THERAPY | Facility: CLINIC | Age: 56
End: 2021-08-30
Payer: COMMERCIAL

## 2021-08-30 DIAGNOSIS — M76.32 ILIOTIBIAL BAND SYNDROME OF LEFT SIDE: Primary | ICD-10-CM

## 2021-08-30 DIAGNOSIS — M25.562 LEFT KNEE PAIN, UNSPECIFIED CHRONICITY: ICD-10-CM

## 2021-08-30 PROCEDURE — 97530 THERAPEUTIC ACTIVITIES: CPT | Performed by: PHYSICAL THERAPIST

## 2021-08-30 PROCEDURE — 97112 NEUROMUSCULAR REEDUCATION: CPT | Performed by: PHYSICAL THERAPIST

## 2021-08-30 PROCEDURE — 97110 THERAPEUTIC EXERCISES: CPT | Performed by: PHYSICAL THERAPIST

## 2021-08-30 NOTE — PROGRESS NOTES
Daily Note     Today's date: 2021  Patient name: Jeane Khan  : 1965  MRN: [id number]  Referring provider: Aniket Escudero MD  Dx:   Encounter Diagnosis     ICD-10-CM    1  Iliotibial band syndrome of left side  M76 32    2  Left knee pain, unspecified chronicity  M25 562           Subjective: Pt reported that she has not been doing her exercises over the past week because of dental surgery being done  Objective: See treatment diary below    Assessment: Tolerated treatment well  Patient demonstrated fatigue post treatment, exhibited good technique with therapeutic exercises and would benefit from continued PT  Plan: Continue per plan of care        Precautions: standard    Manuals    jt mob/pat mob             STM, PROM stretch                                       Neuro Re-Ed    SLS foam             Tandem Foam                                        SLS BOSU :30x3 ea      :20x3  :30x3 ea :30x3 :30x3                BOSU squat 3x10      3x10  3x10 3x10   Slider lunge walk 3x        5x 16' ea 5# 2x10 ea 5x    Fitter AP u/l 3x10                         Ther Ex    Slider lunge       2x10 ea +       BOSU hamstring slider b/l       3x10      STS w/ db        10# 3x10 10# 3x10 10# 3x12   Seated  hamstring stretch :30x3 ea         :20x3 ea   Prone knee flexion stretch strap :30x3 ea            Standing quad stretch :30x3 ea       :30x3 :30x3 ea :30x3   Stand TB march        O 3x15 ea  O 3x15 ea   Hip 3 way TB              Ther Activity    Elliptical 5'          5', 1 5x3   U/l LP in PF        90/ 3x10 ea 90/     U/l LP eccentric (2 concentric, 1 ecc) 90/ 2x10 ea       90/ 10x2 ea 90/ 2x10 ea 90/ 2x10   Calf press 130/ 3x12        130/ 3x10    Lateral step down       8" 3x10      Forward step down       8" 3x10      Step ups          10# 8" 2x10   U/l STS 2 hand assist 3x10 ea        2x10 ea 2x10 ea Modalities 8/30 8/2 8/9 8/16 8/23   [facility]             CT

## 2021-09-13 ENCOUNTER — APPOINTMENT (OUTPATIENT)
Dept: PHYSICAL THERAPY | Facility: CLINIC | Age: 56
End: 2021-09-13
Payer: COMMERCIAL

## 2021-09-20 ENCOUNTER — OFFICE VISIT (OUTPATIENT)
Dept: PHYSICAL THERAPY | Facility: CLINIC | Age: 56
End: 2021-09-20
Payer: COMMERCIAL

## 2021-09-20 DIAGNOSIS — M76.32 ILIOTIBIAL BAND SYNDROME OF LEFT SIDE: Primary | ICD-10-CM

## 2021-09-20 DIAGNOSIS — M25.562 LEFT KNEE PAIN, UNSPECIFIED CHRONICITY: ICD-10-CM

## 2021-09-20 PROCEDURE — 97116 GAIT TRAINING THERAPY: CPT

## 2021-09-20 PROCEDURE — 97112 NEUROMUSCULAR REEDUCATION: CPT

## 2021-09-20 PROCEDURE — 97110 THERAPEUTIC EXERCISES: CPT

## 2021-09-20 NOTE — PROGRESS NOTES
Daily Note     Today's date: 2021  Patient name: Jory Avila  : 1965  MRN: [id number]  Referring provider: Claudia Burns MD  Dx:   Encounter Diagnosis     ICD-10-CM    1  Iliotibial band syndrome of left side  M76 32    2  Left knee pain, unspecified chronicity  M25 562           Subjective: Caryl Nolasco reports she was able to hike w/o L knee pain  She states overall she is feeling stronger and is able to do a lot more with minimal discomfort  Objective: See treatment diary below    Assessment: Miriam tolerated PT treatment well  Pt challenged with current exercise program  Pt displays independence with all exercises, reports no L knee pain/discomfort, and has met all goals for psychical therapy pt will be D/C to HEP per PT review  Pt was given updated HEP and TB for home use  Plan: D/C to HEP per PT review        Precautions: standard    Manuals    jt mob/pat mob             STM, PROM stretch                                       Neuro Re-Ed    SLS foam             Tandem Foam                                        SLS BOSU :30x3 ea :30x3 ea      :20x3  :30x3 ea :30x3 :30x3                BOSU squat 3x10 3x10     3x10  3x10 3x10   Slider lunge walk 3x  3x      5x 16' ea 5# 2x10 ea 5x    Fitter AP u/l 3x10                         Ther Ex    Slider lunge       2x10 ea +       BOSU hamstring slider b/l       3x10      STS w/ db        10# 3x10 10# 3x10 10# 3x12   Seated  hamstring stretch :30x3 ea :30x3        :20x3 ea   Prone knee flexion stretch strap :30x3 ea :30x3           Standing quad stretch :30x3 ea :30x3      :30x3 :30x3 ea :30x3   Stand TB march        O 3x15 ea  O 3x15 ea   Hip 3 way TB              Ther Activity    Elliptical 5'  5'        5', 1 5x3   U/l LP in PF        90/ 3x10 ea 90/     U/l LP eccentric (2 concentric, 1 ecc) 90/ 2x10 ea 90/ 2x10 ea      90/ 10x2 ea 90/ 2x10 ea 90/ 2x10   Calf press 130/ 3x12        130/ 3x10    Lateral step down       8" 3x10      Forward step down       8" 3x10      Step ups          10# 8" 2x10   U/l STS 2 hand assist 3x10 ea 3x10 ea       2x10 ea 2x10 ea   Modalities 8/30 9/20 8/2 8/9 8/16 8/23   [facility]

## 2024-07-23 ENCOUNTER — HOSPITAL ENCOUNTER (OUTPATIENT)
Dept: HOSPITAL 99 - WDC | Age: 59
End: 2024-07-23
Payer: COMMERCIAL

## 2024-07-23 DIAGNOSIS — Z12.31: Primary | ICD-10-CM

## (undated) DEVICE — NEEDLE HYPO 22G X 1-1/2 IN

## (undated) DEVICE — EXIDINE 4 PCT

## (undated) DEVICE — WET SKIN PREP TRAY: Brand: MEDLINE INDUSTRIES, INC.

## (undated) DEVICE — SCD SEQUENTIAL COMPRESSION COMFORT SLEEVE MEDIUM KNEE LENGTH: Brand: KENDALL SCD

## (undated) DEVICE — CATH FOLEY 18FR 5ML 2 WAY SILICONE ELASTIMER

## (undated) DEVICE — ALLENTOWN DR  LUCENTE S LAP PK: Brand: CARDINAL HEALTH

## (undated) DEVICE — GLOVE SRG BIOGEL 6.5

## (undated) DEVICE — INTENDED FOR TISSUE SEPARATION, AND OTHER PROCEDURES THAT REQUIRE A SHARP SURGICAL BLADE TO PUNCTURE OR CUT.: Brand: BARD-PARKER SAFETY BLADES SIZE 11, STERILE

## (undated) DEVICE — GLOVE INDICATOR PI UNDERGLOVE SZ 6.5 BLUE